# Patient Record
Sex: MALE | Race: WHITE
[De-identification: names, ages, dates, MRNs, and addresses within clinical notes are randomized per-mention and may not be internally consistent; named-entity substitution may affect disease eponyms.]

---

## 2024-01-11 ENCOUNTER — NON-APPOINTMENT (OUTPATIENT)
Age: 54
End: 2024-01-11

## 2024-01-11 ENCOUNTER — APPOINTMENT (OUTPATIENT)
Dept: NEUROLOGY | Facility: CLINIC | Age: 54
End: 2024-01-11
Payer: COMMERCIAL

## 2024-01-11 VITALS
HEART RATE: 78 BPM | BODY MASS INDEX: 24.38 KG/M2 | HEIGHT: 72 IN | WEIGHT: 180 LBS | SYSTOLIC BLOOD PRESSURE: 135 MMHG | TEMPERATURE: 98.2 F | DIASTOLIC BLOOD PRESSURE: 88 MMHG

## 2024-01-11 DIAGNOSIS — Z78.9 OTHER SPECIFIED HEALTH STATUS: ICD-10-CM

## 2024-01-11 DIAGNOSIS — Z87.2 PERSONAL HISTORY OF DISEASES OF THE SKIN AND SUBCUTANEOUS TISSUE: ICD-10-CM

## 2024-01-11 DIAGNOSIS — Z82.0 FAMILY HISTORY OF EPILEPSY AND OTHER DISEASES OF THE NERVOUS SYSTEM: ICD-10-CM

## 2024-01-11 DIAGNOSIS — G43.909 MIGRAINE, UNSPECIFIED, NOT INTRACTABLE, W/OUT STATUS MIGRAINOSUS: ICD-10-CM

## 2024-01-11 PROCEDURE — 99204 OFFICE O/P NEW MOD 45 MIN: CPT

## 2024-01-11 RX ORDER — RIZATRIPTAN BENZOATE 5 MG/1
5 TABLET, ORALLY DISINTEGRATING ORAL
Qty: 12 | Refills: 5 | Status: ACTIVE | COMMUNITY
Start: 2024-01-11 | End: 1900-01-01

## 2024-01-11 NOTE — CONSULT LETTER
[Dear  ___] : Dear  [unfilled], [Consult Letter:] : I had the pleasure of evaluating your patient, [unfilled]. [Please see my note below.] : Please see my note below. [Consult Closing:] : Thank you very much for allowing me to participate in the care of this patient.  If you have any questions, please do not hesitate to contact me. [FreeTextEntry2] : Matthew Felton [FreeTextEntry3] : Sincerely,   Barbara Roman MD Diplomate, [employer] Board Certified in the Subspecialty of Clinical Neurophysiology Board Certified in the Subspecialty of Sleep Medicine Board Certified in the Subspecialty of Epilepsy

## 2024-01-11 NOTE — DATA REVIEWED
[de-identified] : MRI brain non-contrast 12/28/18:  Normal noncontrast MRI of the brain Atelectatic opacified right maxillary sinus

## 2024-01-11 NOTE — PHYSICAL EXAM
[FreeTextEntry1] : Examination: Constitutional: normal, no apparent distress Eyes: normal conjunctiva b/l, no ptosis, visual fields full Respiratory: no respiratory distress, normal effort, normal auscultation Cardiovascular: normal rate, rhythm, no murmurs Neck: supple, no masses Vascular: carotids normal Skin: normal color, no rashes Psych: normal mood, affect  Neurological: Memory: normal memory, oriented to person, place, time Language intact/no aphasia Cranial Nerves: II-XII intact, Pupils equally round and reactive to light, ocular muscles/movements intact, no ptosis, no facial weakness, tongue protrudes normally in the midline,  Motor: normal tone, no pronator drift, full strength in all four extremities in the proximal and distal muscle groups Coordination: Fine motor movements intact, rapid alternating movements intact, finger to nose intact bilaterally Sensory: intact to light touch, vibration, joint position sense, negative Romberg examination DTRs: symmetric, 2+ in b/l triceps, 2+ in b/l biceps, 2+ in b/l brachioradialis, 2+ in bilateral patellars, 1+ in bilateral Achilles, Babinskis negative bilaterally Gait: narrow based, steady, able to walk on heels, toes, tandem gait

## 2024-01-11 NOTE — HISTORY OF PRESENT ILLNESS
[FreeTextEntry1] : Mr. Lara is here today for neurology evaluation. He has had migraines for several years. He has periods with frequent migraines (months with two migraines per week) and sometimes there can be a month without a migraine.  Migraines tend to occur in the middle of the night and wake him up. He often knows they will happen because he has a foggy feeling when he goes to bed.  He is not aware of an aura. He describes his headaches as a deep throbbing pain with associated nausea and photophobia. He is not aware of phonophobia (headaches typically occur at night).  He uses ibuprofen. It will take the edge off after a few hours. If he takes ibuprofen before bed when he feels foggy it sometimes works. He tried rizatriptan 5 mg which was prescribed to his daughter and it worked very well.  He finds that migraines occur when he is very tired. Alcohol sometimes triggers a migraine. In the past he had migraines at night following basketball.  He does not have migraines with other exercise (which is less vigorous). He has had migraines following intercourse but not thunderclap headaches at the time of intercourse.  His daughter has migraines.

## 2024-01-11 NOTE — DISCUSSION/SUMMARY
[FreeTextEntry1] : Mr. Lara is a 53 year old man with a history of headaches most suggestive of migraines. He has a non-focal neurological examination. MRI brain was performed in 2018. Abnormalities were seen within the sinuses; there was no significant brain pathology.  Migraines (not aware of aura, but most occur during sleep). -We discussed preventative vs abortive treatment. -At this time he is not interested in a daily medication. -He has tried magnesium supplements and has not noted significant benefit. He can try Migrelief (magnesium + riboflavin + feverfew) as a daily supplement. -Rizatriptan has been effective to abort migraines. Will prescribe rizatriptan MLT 5 mg. He can repeat the dose after 2 hours if needed with a maximum daily dose of 20 mg. Potential side effects discussed. -Track headache frequency.  -We discussed potential options for preventative treatment in case migraine frequency increases. -Avoid known triggers. -For exercise (basketball) induced migraines he can try taking ibuprofen at night to see if this will prevent a headache.  -His symptoms have not changed since his last MRI brain so no need to repeat imaging at this time. If there is a change in headache characteristics can get a new MRI.  f/u 6 months, sooner if needed.

## 2024-07-12 ENCOUNTER — APPOINTMENT (OUTPATIENT)
Dept: NEUROLOGY | Facility: CLINIC | Age: 54
End: 2024-07-12

## 2024-11-27 ENCOUNTER — APPOINTMENT (OUTPATIENT)
Dept: NEUROLOGY | Facility: CLINIC | Age: 54
End: 2024-11-27

## 2025-09-10 ENCOUNTER — EMERGENCY (EMERGENCY)
Facility: HOSPITAL | Age: 55
LOS: 1 days | End: 2025-09-10
Attending: EMERGENCY MEDICINE | Admitting: EMERGENCY MEDICINE
Payer: COMMERCIAL

## 2025-09-10 VITALS
DIASTOLIC BLOOD PRESSURE: 91 MMHG | OXYGEN SATURATION: 97 % | SYSTOLIC BLOOD PRESSURE: 151 MMHG | HEART RATE: 88 BPM | TEMPERATURE: 98 F | RESPIRATION RATE: 18 BRPM | WEIGHT: 182.1 LBS

## 2025-09-10 VITALS
HEART RATE: 65 BPM | DIASTOLIC BLOOD PRESSURE: 89 MMHG | RESPIRATION RATE: 18 BRPM | OXYGEN SATURATION: 97 % | TEMPERATURE: 98 F | SYSTOLIC BLOOD PRESSURE: 146 MMHG

## 2025-09-10 PROCEDURE — 99284 EMERGENCY DEPT VISIT MOD MDM: CPT

## 2025-09-10 PROCEDURE — 70450 CT HEAD/BRAIN W/O DYE: CPT

## 2025-09-10 PROCEDURE — 72125 CT NECK SPINE W/O DYE: CPT | Mod: 26

## 2025-09-10 PROCEDURE — 71250 CT THORAX DX C-: CPT

## 2025-09-10 PROCEDURE — 70450 CT HEAD/BRAIN W/O DYE: CPT | Mod: 26

## 2025-09-10 PROCEDURE — 99284 EMERGENCY DEPT VISIT MOD MDM: CPT | Mod: 25

## 2025-09-10 PROCEDURE — 71250 CT THORAX DX C-: CPT | Mod: 26

## 2025-09-10 PROCEDURE — 72125 CT NECK SPINE W/O DYE: CPT

## 2025-09-10 RX ORDER — IBUPROFEN 200 MG
600 TABLET ORAL ONCE
Refills: 0 | Status: COMPLETED | OUTPATIENT
Start: 2025-09-10 | End: 2025-09-10

## 2025-09-10 RX ORDER — LIDOCAINE HYDROCHLORIDE 20 MG/ML
1 JELLY TOPICAL ONCE
Refills: 0 | Status: COMPLETED | OUTPATIENT
Start: 2025-09-10 | End: 2025-09-10

## 2025-09-10 RX ADMIN — LIDOCAINE HYDROCHLORIDE 1 PATCH: 20 JELLY TOPICAL at 18:00

## 2025-09-10 RX ADMIN — Medication 600 MILLIGRAM(S): at 18:00

## 2025-09-12 PROBLEM — E78.5 HYPERLIPIDEMIA, UNSPECIFIED: Chronic | Status: ACTIVE | Noted: 2025-09-10

## 2025-09-17 ENCOUNTER — APPOINTMENT (OUTPATIENT)
Dept: ORTHOPEDIC SURGERY | Facility: CLINIC | Age: 55
End: 2025-09-17